# Patient Record
Sex: FEMALE | Race: WHITE | NOT HISPANIC OR LATINO | Employment: FULL TIME | URBAN - METROPOLITAN AREA
[De-identification: names, ages, dates, MRNs, and addresses within clinical notes are randomized per-mention and may not be internally consistent; named-entity substitution may affect disease eponyms.]

---

## 2019-05-21 ENCOUNTER — OFFICE VISIT (OUTPATIENT)
Dept: GASTROENTEROLOGY | Facility: CLINIC | Age: 34
End: 2019-05-21

## 2019-05-21 ENCOUNTER — LAB (OUTPATIENT)
Dept: LAB | Facility: HOSPITAL | Age: 34
End: 2019-05-21

## 2019-05-21 ENCOUNTER — PREP FOR SURGERY (OUTPATIENT)
Dept: OTHER | Facility: HOSPITAL | Age: 34
End: 2019-05-21

## 2019-05-21 VITALS
HEART RATE: 88 BPM | SYSTOLIC BLOOD PRESSURE: 126 MMHG | BODY MASS INDEX: 27.29 KG/M2 | WEIGHT: 164 LBS | DIASTOLIC BLOOD PRESSURE: 81 MMHG

## 2019-05-21 DIAGNOSIS — R10.33 PERIUMBILICAL ABDOMINAL PAIN: Primary | ICD-10-CM

## 2019-05-21 DIAGNOSIS — R10.9 ABDOMINAL PAIN, UNSPECIFIED ABDOMINAL LOCATION: Primary | ICD-10-CM

## 2019-05-21 DIAGNOSIS — R10.33 PERIUMBILICAL ABDOMINAL PAIN: ICD-10-CM

## 2019-05-21 PROCEDURE — 99243 OFF/OP CNSLTJ NEW/EST LOW 30: CPT | Performed by: INTERNAL MEDICINE

## 2019-05-21 PROCEDURE — 86255 FLUORESCENT ANTIBODY SCREEN: CPT

## 2019-05-21 PROCEDURE — 83516 IMMUNOASSAY NONANTIBODY: CPT

## 2019-05-21 PROCEDURE — 86671 FUNGUS NES ANTIBODY: CPT

## 2019-05-21 PROCEDURE — 82784 ASSAY IGA/IGD/IGG/IGM EACH: CPT

## 2019-05-21 PROCEDURE — 36415 COLL VENOUS BLD VENIPUNCTURE: CPT

## 2019-05-21 RX ORDER — SIMETHICONE 20 MG/.3ML
200 EMULSION ORAL ONCE
Status: CANCELLED | OUTPATIENT
Start: 2019-05-21 | End: 2019-05-21

## 2019-05-21 RX ORDER — AMITRIPTYLINE HYDROCHLORIDE 25 MG/1
25 TABLET, FILM COATED ORAL NIGHTLY
Qty: 60 TABLET | Refills: 2 | Status: SHIPPED | OUTPATIENT
Start: 2019-05-21 | End: 2020-01-02

## 2019-05-21 NOTE — PROGRESS NOTES
"GASTROENTEROLOGY OFFICE NOTE  Carmenza Mansfield  [id number]  1985    CARE TEAM  Patient Care Team:  Noemi Knott MD as PCP - General (Internal Medicine)    Liberty Upton PA-C     Chief Complaint   Patient presents with   • Abdominal Pain     New patient        HISTORY OF PRESENT ILLNESS:  Initial visit for this 33-year-old white female referred by her primary care provider for evaluation of abdominal pain.    Patient is a 33-year-old white female who presents with abdominal pain.  This abdominal pain is periumbilical and has been occurring intermittently for years now.  It occurred during virtually the entirety of her pregnancy (she now has a 2-year-old).  During that time a cause for this abdominal pain was never established.  Work-up included an ultrasound which was nondiagnostic and laboratory testing.  Since then she continues to have this pain.  When it occurs it lasts perhaps up to 5 or even 6days.  It is a mid abdominal pain.  She might go for 2 months without having it.  Once it sets in it seems to be worse after meals but she can identify nothing that makes it better.  She likens it to having a \"stomach virus \"during this time she really does not have any nausea nor vomiting nor does she have any melena, bright red blood per rectum, diarrhea, fevers or chills.  She is status post cholecystectomy.    Most recent work-up included a CAT scan in September 2018.  That CAT scan was entirely unremarkable.  She also had normal labs at that time with normal CBC/CMP and lipase.    She is currently asymptomatic and states last time she had symptoms was well over a month ago.  She is seeking further work-up and evaluation of these problems and states that she had recently been offered endoscopic evaluation but sought to defer these and seek another opinion in this regard,  Thus prompting her visit to us today.    PAST MEDICAL HISTORY  No past medical history on file.     PAST SURGICAL HISTORY  Past Surgical " History:   Procedure Laterality Date   • CHOLECYSTECTOMY     • MOUTH SURGERY     • WISDOM TOOTH EXTRACTION          MEDICATIONS:  No current outpatient medications on file.    ALLERGIES  No Known Allergies    FAMILY HISTORY:  Family History   Problem Relation Age of Onset   • Colon polyps Father        SOCIAL HISTORY  Social History     Socioeconomic History   • Marital status:      Spouse name: Not on file   • Number of children: Not on file   • Years of education: Not on file   • Highest education level: Not on file   Tobacco Use   • Smoking status: Never Smoker   • Smokeless tobacco: Never Used   Substance and Sexual Activity   • Alcohol use: No   • Drug use: No     Socioeconomic History:  She is  and has a 2-year-old daughter.  She is a non-smoker and occasionally drinks a lot of beverages.  She works at [employer] as a .       REVIEW OF SYSTEMS  Review of Systems   Constitutional: Negative for unexpected weight loss.   HENT: Negative for trouble swallowing.    Eyes: Negative.    Respiratory: Negative.    Gastrointestinal: Positive for abdominal pain. Negative for abdominal distention, anal bleeding, blood in stool, constipation, diarrhea, nausea, rectal pain, vomiting, GERD and indigestion.   Endocrine: Negative.    Genitourinary: Negative.    Musculoskeletal: Negative.    Skin: Negative.    Allergic/Immunologic: Negative.    Neurological: Negative.    Hematological: Negative.    Psychiatric/Behavioral: Negative.      I reviewed the above ROS.    PHYSICAL EXAM   /81   Pulse 88   Wt 74.4 kg (164 lb)   BMI 27.29 kg/m²   General: Alert and oriented x 3. In no apparent or acute distress.  and No stigmata of chronic liver disease  HEENT: Anicteric slcera. Normal oropharynx  Neck: Supple. Without lymphadenopathy  CV: Regular rate and rhythm, S1, S2  Lungs: Clear to ausculation. Without rales, robchi and wheezing  Abdomen:  Soft,non-distended without palpable masses or  hepatosplenomeagaly, areas of rebound tenderness or guarding. With deep palpation towards the right of the periumbilical area the pain is somewhat reproducible without voluntary guarding or rebound.  Extremeties: without clubbing, cyanosis or edema  Neurologic:  Alert and oriented x 3 without focal motor or sensory deficits  Rectal exam: deferred        Results Review:  I reviewed the patient's new clinical results.      ASSESSMENT  1.-  Intermittent mid abdominal pain of unclear etiology.  There is perhaps some element of a myofascial pain given the fact that I am able to reproduce somewhat with deep palpation but this finding is somewhat equivocal.  I think that in the absence of any active symptoms endoscopic evaluation is unlikely to be diagnostic and should be reserved for when she is symptomatic or soon thereafter.  Given the location of her pain in the periumbilical area small bowel pathology is a consideration such as angioneurotic edema, small bowel inflammatory bowel disease and consideration should also be given the metabolic causes of abdominal pain such as porphyria but all these are unlikely.    PLAN  1.-  Check Prometheus panel to see if we need to be more concerned about the possibility of small bowel Crohn's disease  2.-  Check comprehensive celiac panel  3.-  PillCam is suggested when she is having pain.  We will go ahead and try to get a PillCam preapproved so that when it occurs we might be able to expedite getting the PillCam done (a prolonged preapproval.  Is usually necessary)  4.-  Trial of Elavil 25 to 50 mg p.o. nightly titrated to response and tolerance for the possibility that this may represent a myofascial type pain  5.-  Follow-up appointment in 3 to 4 months and course patient will call us if and when her symptoms recur.      I discussed the patients findings and my recommendations with patient    Gunnar Avila MD  5/21/2019   2:48 PM    Much of this note is an  electronic transcription of spoken language to printed text. Electronic transcription of spoken language may permit erroneous, nonsensical word phrases to be inadvertently transcribed.  Although I have reviewed the note for these errors, some may still be present.

## 2019-05-23 LAB
ENDOMYSIUM IGA SER QL: NEGATIVE
GLIADIN PEPTIDE IGA SER-ACNC: 3 UNITS (ref 0–19)
GLIADIN PEPTIDE IGG SER-ACNC: 2 UNITS (ref 0–19)
IGA SERPL-MCNC: 200 MG/DL (ref 87–352)
TTG IGA SER-ACNC: <2 U/ML (ref 0–3)
TTG IGG SER-ACNC: <2 U/ML (ref 0–5)

## 2019-05-28 LAB
BAKER'S YEAST IGG QN IA: 25 UNITS (ref 0–50)
CHITOBIOSIDE IGA SERPL IA-ACNC: 21 UNITS (ref 0–90)
LABORATORY COMMENT REPORT: ABNORMAL
LAMINARIBIOSIDE IGG SERPL IA-ACNC: 135 UNITS (ref 0–60)
MANNOBIOSIDE IGG SERPL IA-ACNC: 64 UNITS (ref 0–100)
P-ANCA ATYPICAL TITR SER IF: NEGATIVE {TITER}

## 2019-09-03 ENCOUNTER — OFFICE VISIT (OUTPATIENT)
Dept: GASTROENTEROLOGY | Facility: CLINIC | Age: 34
End: 2019-09-03

## 2019-09-03 VITALS
WEIGHT: 159.6 LBS | DIASTOLIC BLOOD PRESSURE: 71 MMHG | HEIGHT: 65 IN | BODY MASS INDEX: 26.59 KG/M2 | HEART RATE: 88 BPM | SYSTOLIC BLOOD PRESSURE: 109 MMHG

## 2019-09-03 DIAGNOSIS — R10.33 PERIUMBILICAL ABDOMINAL PAIN: Primary | ICD-10-CM

## 2019-09-03 PROCEDURE — 99214 OFFICE O/P EST MOD 30 MIN: CPT | Performed by: INTERNAL MEDICINE

## 2019-09-03 RX ORDER — AZITHROMYCIN 250 MG/1
TABLET, FILM COATED ORAL
COMMUNITY
Start: 2019-08-30

## 2019-09-03 RX ORDER — FLUTICASONE PROPIONATE 50 MCG
SPRAY, SUSPENSION (ML) NASAL
COMMUNITY

## 2019-09-03 RX ORDER — ERGOCALCIFEROL 1.25 MG/1
50000 CAPSULE ORAL DAILY
COMMUNITY
Start: 2019-08-15

## 2019-09-03 NOTE — PROGRESS NOTES
GASTROENTEROLOGY OFFICE NOTE  Carmenza Mansfield  [id number]  1985        CARE TEAM  Patient Care Team:  Noemi Knott MD as PCP - General (Internal Medicine)    No ref. provider found     Chief Complaint   Patient presents with   • Follow-up   • Abdominal Pain     Periumbilical         HISTORY OF PRESENT ILLNESS:  Patient presents for evaluation of chronic abdominal pain for which she had an equivocal inflammatory bowel disease antibody panel (only her ALCA was elevated).  She had 2 episodes since I last saw her in May of abdominal pain which is been lasting 6 days lasted 3 days also recently had a urinary tract infection which required antibiotic therapy.    She is here today without dysphagia to solids or odynophagia she denies weight loss melena or bright red blood per rectum.    PAST MEDICAL HISTORY  History reviewed. No pertinent past medical history.     PAST SURGICAL HISTORY  Past Surgical History:   Procedure Laterality Date   • CHOLECYSTECTOMY     • MOUTH SURGERY     • WISDOM TOOTH EXTRACTION          MEDICATIONS:    Current Outpatient Medications:   •  amitriptyline (ELAVIL) 25 MG tablet, Take 1 tablet by mouth Every Night. Take one tablet orally at bedtime for 7 days. Then take 2 tablets orally at bedtime thereafter., Disp: 60 tablet, Rfl: 2  •  azithromycin (ZITHROMAX) 250 MG tablet, , Disp: , Rfl:   •  fluticasone (FLONASE) 50 MCG/ACT nasal spray, fluticasone propionate 50 mcg/actuation nasal spray,suspension  Spray 1 spray every day by intranasal route., Disp: , Rfl:   •  vitamin D (ERGOCALCIFEROL) 49632 units capsule capsule, , Disp: , Rfl:     ALLERGIES  No Known Allergies    FAMILY HISTORY:  Family History   Problem Relation Age of Onset   • Colon polyps Father    • Colon cancer Neg Hx        SOCIAL HISTORY  Social History     Socioeconomic History   • Marital status:      Spouse name: Not on file   • Number of children: Not on file   • Years of education: Not on file   • Highest education  level: Not on file   Tobacco Use   • Smoking status: Never Smoker   • Smokeless tobacco: Never Used   Substance and Sexual Activity   • Alcohol use: Yes     Comment: 1-2 times a month    • Drug use: No   • Sexual activity: Defer     Socioeconomic History:  She works as a  at [employer].  He is  and is a 2-year-old daughter.       REVIEW OF SYSTEMS  Review of Systems   Constitutional: Positive for appetite change. Negative for activity change, chills, diaphoresis, fatigue, fever, unexpected weight gain and unexpected weight loss.   HENT: Negative for congestion, dental problem, drooling, ear discharge, ear pain, facial swelling, hearing loss, mouth sores, nosebleeds, postnasal drip, rhinorrhea, sinus pressure, sneezing, sore throat, swollen glands, tinnitus, trouble swallowing and voice change.    Respiratory: Positive for cough. Negative for apnea, choking, chest tightness, shortness of breath, wheezing and stridor.    Cardiovascular: Negative for chest pain, palpitations and leg swelling.   Gastrointestinal: Positive for abdominal pain and diarrhea. Negative for abdominal distention, anal bleeding, blood in stool, constipation, nausea, rectal pain, vomiting, GERD and indigestion.   Endocrine: Negative for cold intolerance, heat intolerance, polydipsia, polyphagia and polyuria.   Musculoskeletal: Negative for arthralgias, back pain, gait problem, joint swelling, myalgias, neck pain, neck stiffness and bursitis.   Allergic/Immunologic: Negative for environmental allergies, food allergies and immunocompromised state.   Neurological: Positive for headache. Negative for dizziness, tremors, seizures, syncope, facial asymmetry, speech difficulty, weakness, light-headedness, numbness, memory problem and confusion.   Hematological: Negative for adenopathy. Does not bruise/bleed easily.   Psychiatric/Behavioral: Negative for agitation, behavioral problems, decreased concentration, dysphoric mood,  "hallucinations, self-injury, sleep disturbance, suicidal ideas, negative for hyperactivity, depressed mood and stress. The patient is not nervous/anxious.      I reviewed the above ROS.    PHYSICAL EXAM   /71 (BP Location: Right arm, Patient Position: Sitting, Cuff Size: Adult)   Pulse 88   Ht 165.1 cm (65\")   Wt 72.4 kg (159 lb 9.6 oz)   BMI 26.56 kg/m²   General: Alert and oriented x 3. In no apparent or acute distress.  and No stigmata of chronic liver disease  HEENT: Anicteric slcera. Normal oropharynx  Neck: Supple. Without lymphadenopathy  CV: Regular rate and rhythm, S1, S2  Lungs: Clear to ausculation. Without rales, robchi and wheezing  Abdomen:  Soft,non-distended without palpable masses or hepatosplenomeagaly, areas of rebound tenderness or guarding.   Extremeties: without clubbing, cyanosis or edema  Neurologic:  Alert and oriented x 3 without focal motor or sensory deficits  Rectal exam: deferred     Results for orders placed or performed in visit on 05/21/19   IBD Expanded Panel   Result Value Ref Range    Joe 25 0 - 50 units    ACCA 21 0 - 90 units    ALCA 135 (H) 0 - 60 units    AMCA 64 0 - 100 units    Atypical pANCA Negative Negative    Comment Comment (A)    Celiac Comprehensive Panel   Result Value Ref Range    Gliadin Deamidated Peptide Ab, IgA 3 0 - 19 units    Deaminated Gliadin Ab IgG 2 0 - 19 units    Tissue Transglutaminase IgA <2 0 - 3 U/mL    Tissue Transglutaminase IgG <2 0 - 5 U/mL    Endomysial IgA Negative Negative    IgA 200 87 - 352 mg/dL        Results Review:  I reviewed the patient's new clinical results.      ASSESSMENT  1.-  Chronic recurrent abdominal pain of unclear etiology improved somewhat with amitriptyline with equivocal antibody testing for inflammatory bowel disease.    PLAN  1.-  Continue Elavil  2.-  Consider colonoscopy for exclusion of inflammatory bowel disease.  I think this would be best done when she is symptomatic therefore she will call us when " this is the case at which time we will try to get her in for follow-up colonoscopy.  She is currently asymptomatic and the yield for colonoscopy to look for inflammatory bowel disease but she is asymptomatic I think would be relatively low.  She is in agreement.  3.-   Return appointment in 6 months    I discussed the patients findings and my recommendations with patient    Gunnar Markus Avila MD  9/3/2019   2:20 PM    Much of this note is an electronic transcription of spoken language to printed text. Electronic transcription of spoken language may permit erroneous, nonsensical word phrases to be inadvertently transcribed.  Although I have reviewed the note for these errors, some may still be present.

## 2020-01-02 RX ORDER — AMITRIPTYLINE HYDROCHLORIDE 25 MG/1
TABLET, FILM COATED ORAL
Qty: 60 TABLET | Refills: 1 | Status: SHIPPED | OUTPATIENT
Start: 2020-01-02 | End: 2020-05-18

## 2020-05-18 RX ORDER — AMITRIPTYLINE HYDROCHLORIDE 25 MG/1
50 TABLET, FILM COATED ORAL NIGHTLY
Qty: 60 TABLET | Refills: 0 | Status: SHIPPED | OUTPATIENT
Start: 2020-05-18 | End: 2020-07-27 | Stop reason: SDUPTHER

## 2020-07-27 RX ORDER — AMITRIPTYLINE HYDROCHLORIDE 25 MG/1
50 TABLET, FILM COATED ORAL NIGHTLY
Qty: 60 TABLET | Refills: 0 | Status: SHIPPED | OUTPATIENT
Start: 2020-07-27 | End: 2020-09-23 | Stop reason: SDUPTHER

## 2020-09-23 ENCOUNTER — OFFICE VISIT (OUTPATIENT)
Dept: GASTROENTEROLOGY | Facility: CLINIC | Age: 35
End: 2020-09-23

## 2020-09-23 DIAGNOSIS — R10.9 TRIGGER POINT OF ABDOMEN: ICD-10-CM

## 2020-09-23 DIAGNOSIS — R10.33 PERIUMBILICAL ABDOMINAL PAIN: Primary | ICD-10-CM

## 2020-09-23 DIAGNOSIS — K58.8 OTHER IRRITABLE BOWEL SYNDROME: ICD-10-CM

## 2020-09-23 PROCEDURE — 99441 PR PHYS/QHP TELEPHONE EVALUATION 5-10 MIN: CPT | Performed by: INTERNAL MEDICINE

## 2020-09-23 RX ORDER — AMITRIPTYLINE HYDROCHLORIDE 25 MG/1
50 TABLET, FILM COATED ORAL NIGHTLY
Qty: 60 TABLET | Refills: 11 | Status: SHIPPED | OUTPATIENT
Start: 2020-09-23

## 2020-09-23 NOTE — PROGRESS NOTES
GASTROENTEROLOGY OFFICE NOTE  Carmenza Mansfield  [id number]  1985    CARE TEAM  Patient Care Team:  Noemi Knott MD as PCP - General (Internal Medicine)  Gunnar Avila MD as Consulting Physician (Gastroenterology)     You have chosen to receive care through a telephone visit. Do you consent to use a telephone visit for your medical care today? Yes      No ref. provider found     Chief Complaint   Patient presents with   • Follow-up     Periumbilical abdominal pain    • Med Management        HISTORY OF PRESENT ILLNESS:  Patient presents for telephone visit follow-up.    Last seen in September 2019 she was being followed for an equivocal Prometheus panel ( elevated ALCA antibody of 135 units - high normal greater than 60 units- ASCA antibodies and other antibodies were within normal limits,  Celiac panel was also negative)  and what appeared to be myofascial abdominal pain responsive to amitriptyline.    She has continued taking amitriptyline 25 mg p.o. nightly and in the past year she has had 2 episodes of abdominal pain which lasted no more than 4 days one was in the summer the other one was in the spring and the most part this represents a marked improvement of her vertigo presenting complaints where she was having problems much more frequently and symptom duration was greater than a week.    In between these episodes she has remained entirely asymptomatic without any dysphasia, odynophagia, early satiety, unexplained weight loss, melena, bright red blood per rectum, constipation or diarrhea.    PAST MEDICAL HISTORY  History reviewed. No pertinent past medical history.     PAST SURGICAL HISTORY  Past Surgical History:   Procedure Laterality Date   • MOUTH SURGERY     • WISDOM TOOTH EXTRACTION          MEDICATIONS:    Current Outpatient Medications:   •  amitriptyline (ELAVIL) 25 MG tablet, Take 2 tablets by mouth Every Night., Disp: 60 tablet, Rfl: 0  •  Ferrous Sulfate (IRON PO), Take 1 tablet  by mouth Daily., Disp: , Rfl:   •  Multiple Vitamins-Minerals (MULTIPLE VITAMINS/WOMENS PO), Take 1 tablet by mouth Daily., Disp: , Rfl:   •  Probiotic Product (PROBIOTIC PO), Take 1 capsule by mouth Daily., Disp: , Rfl:   •  vitamin D (ERGOCALCIFEROL) 15898 units capsule capsule, Take 50,000 Units by mouth Daily., Disp: , Rfl:   •  azithromycin (ZITHROMAX) 250 MG tablet, , Disp: , Rfl:   •  fluticasone (FLONASE) 50 MCG/ACT nasal spray, fluticasone propionate 50 mcg/actuation nasal spray,suspension  Spray 1 spray every day by intranasal route., Disp: , Rfl:     ALLERGIES  No Known Allergies    FAMILY HISTORY:  Family History   Problem Relation Age of Onset   • Colon polyps Father    • Colon cancer Neg Hx        SOCIAL HISTORY  Social History     Socioeconomic History   • Marital status:      Spouse name: Not on file   • Number of children: Not on file   • Years of education: Not on file   • Highest education level: Not on file   Tobacco Use   • Smoking status: Never Smoker   • Smokeless tobacco: Never Used   Substance and Sexual Activity   • Alcohol use: Yes     Comment: 1-2 times a month    • Drug use: No   • Sexual activity: Defer     Socioeconomic History:   at [employer].  .  3-year-old daughter       REVIEW OF SYSTEMS  Review of Systems   Constitutional: Negative for activity change, appetite change, chills, diaphoresis, fatigue, fever, unexpected weight gain and unexpected weight loss.   HENT: Negative for trouble swallowing and voice change.    Gastrointestinal: Positive for abdominal pain. Negative for abdominal distention, anal bleeding, blood in stool, constipation, diarrhea, nausea, rectal pain, vomiting, GERD and indigestion.     I reviewed the above-noted review of systems    PHYSICAL EXAM   General: Pleasant female in no apparent or acute distress  HEENT: Normal speech   Lungs: Grossly normal respirations without labored breathing or audible wheezing.  Speaking in full  sentences  Neurologic: Normal cognition.  Normal affect.  Alert and oriented.  Normal speech.       ASSESSMENT  1.-  Likely irritable bowel syndrome responsive to amitriptyline.  2.-  Myofascial abdominal pain.  Responsive to amitriptyline  3.-  Suspicion for inflammatory bowel disease remains low    PLAN  1.-  Continue amitriptyline 25 mg p.o. nightly  2.-  Standing orders placed for stool calprotectin which she will check when she is symptomatic.  If elevated, endoscopic evaluation while symptomatic for definitive exclusion of inflammatory bowel disease is recommended.    Gunnar Avila MD  9/23/2020   13:15 EDTse errors, some may still be present.    This visit has been rescheduled as a phone visit to comply with patient safety concerns in accordance with CDC recommendations. Total time of discussion was 10 minutes

## 2020-09-24 PROBLEM — K58.8 OTHER IRRITABLE BOWEL SYNDROME: Status: ACTIVE | Noted: 2020-09-24

## 2020-09-24 PROBLEM — R10.9 TRIGGER POINT OF ABDOMEN: Status: ACTIVE | Noted: 2020-09-24

## 2021-03-23 PROCEDURE — U0004 COV-19 TEST NON-CDC HGH THRU: HCPCS | Performed by: NURSE PRACTITIONER

## 2021-03-23 PROCEDURE — 87086 URINE CULTURE/COLONY COUNT: CPT | Performed by: NURSE PRACTITIONER

## 2022-05-09 ENCOUNTER — TRANSCRIBE ORDERS (OUTPATIENT)
Dept: LAB | Facility: HOSPITAL | Age: 37
End: 2022-05-09

## 2022-05-09 ENCOUNTER — LAB (OUTPATIENT)
Dept: LAB | Facility: HOSPITAL | Age: 37
End: 2022-05-09

## 2022-05-09 DIAGNOSIS — N96 HABITUAL ABORTER: Primary | ICD-10-CM

## 2022-05-09 DIAGNOSIS — N96 HABITUAL ABORTER: ICD-10-CM

## 2022-05-09 LAB
PROLACTIN SERPL-MCNC: 13 NG/ML (ref 4.79–23.3)
TSH SERPL DL<=0.05 MIU/L-ACNC: 1.35 UIU/ML (ref 0.27–4.2)

## 2022-05-09 PROCEDURE — 84443 ASSAY THYROID STIM HORMONE: CPT

## 2022-05-09 PROCEDURE — 85670 THROMBIN TIME PLASMA: CPT

## 2022-05-09 PROCEDURE — 86147 CARDIOLIPIN ANTIBODY EA IG: CPT

## 2022-05-09 PROCEDURE — 85613 RUSSELL VIPER VENOM DILUTED: CPT

## 2022-05-09 PROCEDURE — 86146 BETA-2 GLYCOPROTEIN ANTIBODY: CPT

## 2022-05-09 PROCEDURE — 36415 COLL VENOUS BLD VENIPUNCTURE: CPT

## 2022-05-09 PROCEDURE — 85705 THROMBOPLASTIN INHIBITION: CPT

## 2022-05-09 PROCEDURE — 84146 ASSAY OF PROLACTIN: CPT

## 2022-05-09 PROCEDURE — 85732 THROMBOPLASTIN TIME PARTIAL: CPT

## 2022-05-10 LAB
CARDIOLIPIN IGA SER IA-ACNC: <9 APL U/ML (ref 0–11)
CARDIOLIPIN IGG SER IA-ACNC: <9 GPL U/ML (ref 0–14)
CARDIOLIPIN IGM SER IA-ACNC: 13 MPL U/ML (ref 0–12)

## 2022-05-11 LAB
APTT SCREEN TO CONFIRM RATIO: 0.99 RATIO (ref 0–1.34)
B2 GLYCOPROT1 IGG SER-ACNC: 24 GPI IGG UNITS (ref 0–20)
B2 GLYCOPROT1 IGM SER-ACNC: <9 GPI IGM UNITS (ref 0–32)
CONFIRM APTT/NORMAL: 36.8 SEC (ref 0–47.6)
LA 2 SCREEN W REFLEX-IMP: NORMAL
SCREEN APTT: 32.4 SEC (ref 0–51.9)
SCREEN DRVVT: 38.4 SEC (ref 0–47)
THROMBIN TIME: 16.4 SEC (ref 0–23)

## 2022-09-07 ENCOUNTER — LAB (OUTPATIENT)
Dept: LAB | Facility: HOSPITAL | Age: 37
End: 2022-09-07

## 2022-09-07 ENCOUNTER — TRANSCRIBE ORDERS (OUTPATIENT)
Dept: LAB | Facility: HOSPITAL | Age: 37
End: 2022-09-07

## 2022-09-07 DIAGNOSIS — Z32.00 PREGNANCY EXAMINATION OR TEST, PREGNANCY UNCONFIRMED: ICD-10-CM

## 2022-09-07 DIAGNOSIS — Z32.00 PREGNANCY EXAMINATION OR TEST, PREGNANCY UNCONFIRMED: Primary | ICD-10-CM

## 2022-09-07 LAB — HCG INTACT+B SERPL-ACNC: 111.9 MIU/ML

## 2022-09-07 PROCEDURE — 36415 COLL VENOUS BLD VENIPUNCTURE: CPT

## 2022-09-07 PROCEDURE — 84702 CHORIONIC GONADOTROPIN TEST: CPT

## 2022-09-09 ENCOUNTER — TRANSCRIBE ORDERS (OUTPATIENT)
Dept: LAB | Facility: HOSPITAL | Age: 37
End: 2022-09-09

## 2022-09-09 ENCOUNTER — LAB (OUTPATIENT)
Dept: LAB | Facility: HOSPITAL | Age: 37
End: 2022-09-09

## 2022-09-09 DIAGNOSIS — Z32.00 PREGNANCY EXAMINATION OR TEST, PREGNANCY UNCONFIRMED: Primary | ICD-10-CM

## 2022-09-09 DIAGNOSIS — Z32.00 PREGNANCY EXAMINATION OR TEST, PREGNANCY UNCONFIRMED: ICD-10-CM

## 2022-09-09 LAB — HCG INTACT+B SERPL-ACNC: 305.7 MIU/ML

## 2022-09-09 PROCEDURE — 36415 COLL VENOUS BLD VENIPUNCTURE: CPT

## 2022-09-09 PROCEDURE — 84702 CHORIONIC GONADOTROPIN TEST: CPT

## 2024-05-21 NOTE — PROGRESS NOTES
"    Office Note     Name: Carmenza Mansfield    : 1985     MRN: [id number]     Chief Complaint  Stomach ache    Subjective     History of Present Illness:  Carmenza Mansfield is a 38 y.o. female with PMH significant for cholecystectomy, who presents today for further evaluation of stomach ache.  She reports intermittent episodes of constant periumbilical pain, as though somebody is \"squeezing her insides.\"  She has missed work because of this.  She has tried NSAIDs and heating pads without much relief of her symptoms.  She notes no aggravating factors.      Reported recent incidental finding of small umbilical hernia, not thought to be contributing to symptoms.  Previously evaluated by Dr. Avila for presumed myofascial abdominal pain, responsive to amitriptyline, with low suspicion for IBD.  She reports doing well on this medication, but had to stop taking it throughout her recent 2 pregnancies.  Remote IBD extended panel, with only a ALCA elevation of 135, ASCA antibodies and other antibodies were WNL.  Celiac panel negative.      Standing order previously placed for fecal calprotectin in the event patient became symptomatic.  Previously discussed plans for colonoscopy when symptomatic, for exclusion of inflammatory bowel disease.  She denies any changes in bowel habits, and reports using MiraLAX a couple times a week if needed for constipation.  She denies any dysphasia, odynophagia, early satiety, unexplained weight loss, melena, bright red blood per rectum, constipation or diarrhea.     Past Medical History: No past medical history on file.    Past Surgical History:   Past Surgical History:   Procedure Laterality Date    MOUTH SURGERY      WISDOM TOOTH EXTRACTION         Immunizations:   Immunization History   Administered Date(s) Administered    COVID-19 (MODERNA) BIVALENT 12+YRS 10/18/2022    COVID-19 (PFIZER) Purple Cap Monovalent 2021, 2021, 10/21/2021        Medications:     Current Outpatient " "Medications:     amitriptyline (ELAVIL) 25 MG tablet, Take 2 tablets by mouth Every Night., Disp: 60 tablet, Rfl: 11    Multiple Vitamins-Minerals (MULTIPLE VITAMINS/WOMENS PO), Take 1 tablet by mouth Daily., Disp: , Rfl:     Probiotic Product (PROBIOTIC PO), Take 1 capsule by mouth Daily., Disp: , Rfl:     Allergies:   No Known Allergies    Family History:   Family History   Problem Relation Age of Onset    Colon polyps Father     Colon cancer Neg Hx        Social History:   Social History     Socioeconomic History    Marital status:    Tobacco Use    Smoking status: Never    Smokeless tobacco: Never   Substance and Sexual Activity    Alcohol use: Yes     Comment: 1-2 times a month     Drug use: No    Sexual activity: Defer         Objective     Vital Signs  Ht 165.1 cm (65\")   Wt 80.3 kg (177 lb)   BMI 29.45 kg/m²   Estimated body mass index is 29.45 kg/m² as calculated from the following:    Height as of this encounter: 165.1 cm (65\").    Weight as of this encounter: 80.3 kg (177 lb).          Physical Exam  Vitals reviewed.   Constitutional:       General: She is awake.   Pulmonary:      Effort: Pulmonary effort is normal.   Abdominal:      General: Abdomen is flat.      Palpations: Abdomen is soft.      Tenderness: There is no abdominal tenderness.   Skin:     General: Skin is warm and dry.      Capillary Refill: Capillary refill takes less than 2 seconds.   Neurological:      Mental Status: She is alert.          Assessment and Plan     Assessment & Plan  Periumbilical abdominal pain  Intermittent episodes of constant periumbilical pain, previously responsive to amitriptyline.  Presumed myofascial pain.  Reports she had stopped taking amitriptyline in the last few years due to pregnancies.  She reports having to miss work on a few occasions due to this pain.  No aggravating or relieving factors.  Prior negative celiac panel.  Prior extended IBD panel with only ASCA elevation.  Reviewed patient's copy " of labs from outside clinic, which showed elevated CRP.  Previously discussed with Dr. South's, plans for colonoscopy once patient became symptomatic, for exclusion of IBD.  We will go ahead and get her scheduled for this, as well as check a fecal calprotectin.    Orders Placed This Encounter   Procedures    Calprotectin, Fecal - Stool, Per Rectum     New Medications Ordered This Visit   Medications    amitriptyline (ELAVIL) 25 MG tablet     Sig: Take 2 tablets by mouth Every Night.     Dispense:  60 tablet     Refill:  11          Follow Up  No follow-ups on file.    AYANNA Fairbanks  [facility] [id number]  [facility]STRO[facility]  [address] [place] [id number]

## 2024-05-21 NOTE — ASSESSMENT & PLAN NOTE
Intermittent episodes of constant periumbilical pain, previously responsive to amitriptyline.  Reports she had stopped taking amitriptyline in the last few years due to pregnancies.  She reports having to miss work on a few occasions due to this pain.  No aggravating or relieving factors.  Prior negative celiac panel.  Prior extended IBD panel with only ASCA elevation.  Reviewed patient's copy of labs from outside clinic, which showed elevated CRP.  Previously discussed with Dr. South's, plans for colonoscopy once patient became symptomatic, for exclusion of IBD.  We will go ahead and get her scheduled for this, as well as check a fecal calprotectin.

## 2024-05-22 ENCOUNTER — LAB (OUTPATIENT)
Dept: LAB | Facility: HOSPITAL | Age: 39
End: 2024-05-22
Payer: COMMERCIAL

## 2024-05-22 ENCOUNTER — OFFICE VISIT (OUTPATIENT)
Dept: GASTROENTEROLOGY | Facility: CLINIC | Age: 39
End: 2024-05-22
Payer: COMMERCIAL

## 2024-05-22 VITALS — BODY MASS INDEX: 29.49 KG/M2 | HEIGHT: 65 IN | WEIGHT: 177 LBS

## 2024-05-22 DIAGNOSIS — R10.33 PERIUMBILICAL ABDOMINAL PAIN: Primary | ICD-10-CM

## 2024-05-22 DIAGNOSIS — R10.33 PERIUMBILICAL ABDOMINAL PAIN: ICD-10-CM

## 2024-05-22 PROCEDURE — 83993 ASSAY FOR CALPROTECTIN FECAL: CPT

## 2024-05-22 RX ORDER — AMITRIPTYLINE HYDROCHLORIDE 25 MG/1
50 TABLET, FILM COATED ORAL NIGHTLY
Qty: 60 TABLET | Refills: 11 | Status: SHIPPED | OUTPATIENT
Start: 2024-05-22

## 2024-05-22 NOTE — ASSESSMENT & PLAN NOTE
Intermittent episodes of constant periumbilical pain, previously responsive to amitriptyline.  Presumed myofascial pain.  Reports she had stopped taking amitriptyline in the last few years due to pregnancies.  She reports having to miss work on a few occasions due to this pain.  No aggravating or relieving factors.  Prior negative celiac panel.  Prior extended IBD panel with only ASCA elevation.  Reviewed patient's copy of labs from outside clinic, which showed elevated CRP.  Previously discussed with Dr. South's, plans for colonoscopy once patient became symptomatic, for exclusion of IBD.  We will go ahead and get her scheduled for this, as well as check a fecal calprotectin.

## 2024-05-22 NOTE — PATIENT INSTRUCTIONS
Plan for colonoscopy to rule out inflammatory bowel disease. Will also order fecal calprotectin. You can  the collection kit from the lab.   Trial Elavil for abdominal pain, since it responded well to this before. Let me know if this is not working for you.   Continue miralax as needed to avoid constipation.

## 2024-05-31 LAB — CALPROTECTIN STL-MCNT: 18 UG/G (ref 0–120)

## 2024-07-02 RX ORDER — SODIUM, POTASSIUM,MAG SULFATES 17.5-3.13G
2 SOLUTION, RECONSTITUTED, ORAL ORAL TAKE AS DIRECTED
Qty: 354 ML | Refills: 0 | Status: SHIPPED | OUTPATIENT
Start: 2024-07-02

## 2024-07-16 ENCOUNTER — OUTSIDE FACILITY SERVICE (OUTPATIENT)
Dept: GASTROENTEROLOGY | Facility: CLINIC | Age: 39
End: 2024-07-16
Payer: COMMERCIAL

## 2024-07-16 PROCEDURE — 88305 TISSUE EXAM BY PATHOLOGIST: CPT | Performed by: INTERNAL MEDICINE

## 2024-07-17 ENCOUNTER — LAB REQUISITION (OUTPATIENT)
Dept: LAB | Facility: HOSPITAL | Age: 39
End: 2024-07-17
Payer: COMMERCIAL

## 2024-07-17 DIAGNOSIS — Z12.11 ENCOUNTER FOR SCREENING FOR MALIGNANT NEOPLASM OF COLON: ICD-10-CM

## 2024-07-17 DIAGNOSIS — K57.30 DIVERTICULOSIS OF LARGE INTESTINE WITHOUT PERFORATION OR ABSCESS WITHOUT BLEEDING: ICD-10-CM

## 2024-07-18 LAB — REF LAB TEST METHOD: NORMAL
